# Patient Record
Sex: FEMALE | Race: BLACK OR AFRICAN AMERICAN
[De-identification: names, ages, dates, MRNs, and addresses within clinical notes are randomized per-mention and may not be internally consistent; named-entity substitution may affect disease eponyms.]

---

## 2017-01-27 ENCOUNTER — HOSPITAL ENCOUNTER (EMERGENCY)
Dept: HOSPITAL 17 - PHED | Age: 52
Discharge: HOME | End: 2017-01-27
Payer: COMMERCIAL

## 2017-01-27 VITALS
RESPIRATION RATE: 16 BRPM | HEART RATE: 64 BPM | DIASTOLIC BLOOD PRESSURE: 63 MMHG | SYSTOLIC BLOOD PRESSURE: 115 MMHG | OXYGEN SATURATION: 100 % | TEMPERATURE: 97.9 F

## 2017-01-27 VITALS — BODY MASS INDEX: 23.74 KG/M2 | WEIGHT: 147.71 LBS | HEIGHT: 66 IN

## 2017-01-27 DIAGNOSIS — L02.611: Primary | ICD-10-CM

## 2017-01-27 PROCEDURE — 87205 SMEAR GRAM STAIN: CPT

## 2017-01-27 PROCEDURE — 87070 CULTURE OTHR SPECIMN AEROBIC: CPT

## 2017-01-27 PROCEDURE — 10060 I&D ABSCESS SIMPLE/SINGLE: CPT

## 2018-03-01 ENCOUNTER — HOSPITAL ENCOUNTER (OUTPATIENT)
Dept: HOSPITAL 17 - HRAD | Age: 53
End: 2018-03-01
Attending: GENERAL PRACTICE
Payer: COMMERCIAL

## 2018-03-01 DIAGNOSIS — M79.672: Primary | ICD-10-CM

## 2018-03-01 PROCEDURE — 73620 X-RAY EXAM OF FOOT: CPT

## 2018-03-01 NOTE — RADRPT
EXAM DATE/TIME:  03/01/2018 13:50 

 

[facility] COMPARISON:     

No previous studies available for comparison.

 

                     

INDICATIONS :     

Left proximal lateral foot pain, denies injury

                     

 

MEDICAL HISTORY :     

None.          

 

SURGICAL HISTORY :     

None.   

 

ENCOUNTER:     

Initial                                        

 

ACUITY:     

2 weeks      

 

PAIN SCORE:     

7/10

 

LOCATION:     

Left  Foot

 

FINDINGS:     

Two view examination of the left foot demonstrates no soft tissue swelling, dislocation, or fracture.
  The calcaneus is intact.  Bony mineralization is normal.

 

CONCLUSION:     

Unremarkable limited examination of the left foot.  

 

 

 

 Terrance Licona MD on March 01, 2018 at 14:36           

Board Certified Radiologist.

 This report was verified electronically.